# Patient Record
Sex: FEMALE | Race: WHITE | Employment: FULL TIME | URBAN - NONMETROPOLITAN AREA
[De-identification: names, ages, dates, MRNs, and addresses within clinical notes are randomized per-mention and may not be internally consistent; named-entity substitution may affect disease eponyms.]

---

## 2018-10-19 ENCOUNTER — TELEPHONE (OUTPATIENT)
Dept: FAMILY MEDICINE CLINIC | Age: 28
End: 2018-10-19

## 2019-10-16 ENCOUNTER — HOSPITAL ENCOUNTER (EMERGENCY)
Age: 29
Discharge: HOME | End: 2019-10-16
Payer: COMMERCIAL

## 2019-10-16 VITALS
RESPIRATION RATE: 16 BRPM | OXYGEN SATURATION: 99 % | WEIGHT: 220 LBS | BODY MASS INDEX: 36.61 KG/M2 | HEART RATE: 75 BPM | SYSTOLIC BLOOD PRESSURE: 130 MMHG | DIASTOLIC BLOOD PRESSURE: 72 MMHG | TEMPERATURE: 97.9 F

## 2019-10-16 DIAGNOSIS — J04.0 LARYNGITIS: ICD-10-CM

## 2019-10-16 DIAGNOSIS — R09.81 SINUS CONGESTION: Primary | ICD-10-CM

## 2019-10-16 PROCEDURE — 99212 OFFICE O/P EST SF 10 MIN: CPT

## 2019-10-16 PROCEDURE — 99214 OFFICE O/P EST MOD 30 MIN: CPT | Performed by: NURSE PRACTITIONER

## 2019-10-16 RX ORDER — PREDNISONE 20 MG/1
40 TABLET ORAL DAILY
Qty: 14 TABLET | Refills: 0 | Status: SHIPPED | OUTPATIENT
Start: 2019-10-16 | End: 2019-10-23

## 2019-10-16 ASSESSMENT — ENCOUNTER SYMPTOMS
EYE ITCHING: 0
SINUS PRESSURE: 1
VOMITING: 0
SHORTNESS OF BREATH: 0
EYE REDNESS: 0
COUGH: 0
NAUSEA: 0
SORE THROAT: 1

## 2019-10-16 ASSESSMENT — PAIN DESCRIPTION - PAIN TYPE: TYPE: ACUTE PAIN

## 2019-10-16 ASSESSMENT — PAIN SCALES - GENERAL: PAINLEVEL_OUTOF10: 2

## 2019-10-16 ASSESSMENT — PAIN DESCRIPTION - LOCATION: LOCATION: THROAT

## 2025-01-04 ENCOUNTER — HOSPITAL ENCOUNTER (EMERGENCY)
Age: 35
Discharge: HOME | End: 2025-01-04
Payer: COMMERCIAL

## 2025-01-04 ENCOUNTER — APPOINTMENT (OUTPATIENT)
Dept: GENERAL RADIOLOGY | Age: 35
End: 2025-01-04
Payer: COMMERCIAL

## 2025-01-04 VITALS
SYSTOLIC BLOOD PRESSURE: 132 MMHG | RESPIRATION RATE: 18 BRPM | BODY MASS INDEX: 40.24 KG/M2 | WEIGHT: 241.8 LBS | OXYGEN SATURATION: 96 % | HEART RATE: 93 BPM | TEMPERATURE: 98.8 F | DIASTOLIC BLOOD PRESSURE: 90 MMHG

## 2025-01-04 DIAGNOSIS — J40 BRONCHITIS: Primary | ICD-10-CM

## 2025-01-04 PROCEDURE — 99203 OFFICE O/P NEW LOW 30 MIN: CPT

## 2025-01-04 PROCEDURE — 99213 OFFICE O/P EST LOW 20 MIN: CPT

## 2025-01-04 PROCEDURE — 71046 X-RAY EXAM CHEST 2 VIEWS: CPT

## 2025-01-04 RX ORDER — PREDNISONE 20 MG/1
20 TABLET ORAL 2 TIMES DAILY
Qty: 10 TABLET | Refills: 0 | Status: SHIPPED | OUTPATIENT
Start: 2025-01-04 | End: 2025-01-09

## 2025-01-04 RX ORDER — DEXTROAMPHETAMINE SACCHARATE, AMPHETAMINE ASPARTATE MONOHYDRATE, DEXTROAMPHETAMINE SULFATE AND AMPHETAMINE SULFATE 2.5; 2.5; 2.5; 2.5 MG/1; MG/1; MG/1; MG/1
CAPSULE, EXTENDED RELEASE ORAL
COMMUNITY
Start: 2025-01-03

## 2025-01-04 RX ORDER — GUAIFENESIN 600 MG/1
600 TABLET, EXTENDED RELEASE ORAL 2 TIMES DAILY
Qty: 30 TABLET | Refills: 0 | Status: SHIPPED | OUTPATIENT
Start: 2025-01-04 | End: 2025-01-19

## 2025-01-04 RX ORDER — ESCITALOPRAM OXALATE 20 MG/1
TABLET ORAL
COMMUNITY
Start: 2024-12-12

## 2025-01-04 ASSESSMENT — ENCOUNTER SYMPTOMS
NAUSEA: 0
DIARRHEA: 0
SHORTNESS OF BREATH: 1
SORE THROAT: 0
EYE REDNESS: 0
VOMITING: 0
COUGH: 1
TROUBLE SWALLOWING: 0
RHINORRHEA: 0
EYE DISCHARGE: 0

## 2025-01-04 ASSESSMENT — PAIN - FUNCTIONAL ASSESSMENT
PAIN_FUNCTIONAL_ASSESSMENT: PREVENTS OR INTERFERES WITH MANY ACTIVE NOT PASSIVE ACTIVITIES
PAIN_FUNCTIONAL_ASSESSMENT: NONE - DENIES PAIN

## 2025-01-04 NOTE — ED TRIAGE NOTES
Jessica arrives to room with complaint of  cough for past 1 weeks, hard to breath on left side of ribs, chest congestion,  Pulse ox 92-95 at home.  States he heart rate 144 after making the bed.        Used albuterol neb, mucinex which helped some

## 2025-01-04 NOTE — DISCHARGE INSTRUCTIONS
Prescription for prednisone, Mucinex, and Augmentin.  Take medication until completed. Use over-the-counter Tylenol and Motrin for pain or fever.  Drink plenty of fluids. Follow-up with PCP in 3 to 5 days worsening symptom.  Seek emergency room if you develop severe shortness of breath.

## 2025-01-04 NOTE — ED PROVIDER NOTES
[facility] URGENT CARE  Urgent Care Encounter      CHIEF COMPLAINT       Chief Complaint   Patient presents with    chest congestion on left side       Nurses Notes reviewed and I agree except as noted in the HPI.  HISTORY OF PRESENT ILLNESS   Jessica Sanchez is a 34 y.o. female who presents urgent care for evaluation of chest congestion.  Reports onset of symptoms week ago with cough.  Reports then she has developed some chest congestion.  Reports that it is difficult to breathe and has pain on the left side.  Reports pulse ox at home has been running anywhere from 90-95.  Reports her heart rate after making the bed was 44.  Reports she has used albuterol nebulizer and Mucinex which does provide her some relief but symptoms are not improving.    REVIEW OF SYSTEMS     Review of Systems   Constitutional:  Negative for chills, diaphoresis, fatigue and fever.   HENT:  Positive for congestion. Negative for ear pain, rhinorrhea, sore throat and trouble swallowing.    Eyes:  Negative for discharge and redness.   Respiratory:  Positive for cough and shortness of breath.    Cardiovascular:  Negative for chest pain.   Gastrointestinal:  Negative for diarrhea, nausea and vomiting.   Genitourinary:  Negative for decreased urine volume.   Musculoskeletal:  Negative for neck pain and neck stiffness.   Skin:  Negative for rash.   Neurological:  Negative for headaches.   Hematological:  Negative for adenopathy.   Psychiatric/Behavioral:  Negative for sleep disturbance.        PAST MEDICAL HISTORY         Diagnosis Date    Anemia     taking iron    Mental disorder     anxiety       SURGICAL HISTORY     Patient  has a past surgical history that includes  section ().    CURRENT MEDICATIONS       Previous Medications    ACETAMINOPHEN (TYLENOL) 325 MG TABLET    Take 650 mg by mouth every 6 hours as needed for Pain    AMPHETAMINE-DEXTROAMPHETAMINE (ADDERALL XR) 10 MG EXTENDED RELEASE CAPSULE        ESCITALOPRAM  follow-up with PCP in 3 to 5 days worsening symptom.  Instructed to present to the emergency department for severe dyspnea or other conditions deemed emergent.  Patient is agreeable with the above plan and denies questions or concerns at this time.    PATIENT REFERRED TO:  Shwetha Man APRN - CNP  [id number] [id number]      As needed, If symptoms worsen    DISCHARGE MEDICATIONS:  New Prescriptions    AMOXICILLIN-CLAVULANATE (AUGMENTIN) 875-125 MG PER TABLET    Take 1 tablet by mouth 2 times daily for 7 days    GUAIFENESIN (MUCINEX) 600 MG EXTENDED RELEASE TABLET    Take 1 tablet by mouth 2 times daily for 15 days    PREDNISONE (DELTASONE) 20 MG TABLET    Take 1 tablet by mouth 2 times daily for 5 days     Current Discharge Medication List          LON Fang CNP, Olivia, APRN - CNP  01/04/25 0975